# Patient Record
Sex: FEMALE
[De-identification: names, ages, dates, MRNs, and addresses within clinical notes are randomized per-mention and may not be internally consistent; named-entity substitution may affect disease eponyms.]

---

## 2022-10-05 ENCOUNTER — NON-APPOINTMENT (OUTPATIENT)
Age: 27
End: 2022-10-05

## 2024-05-29 ENCOUNTER — APPOINTMENT (OUTPATIENT)
Dept: PSYCHIATRY | Facility: CLINIC | Age: 29
End: 2024-05-29
Payer: COMMERCIAL

## 2024-05-29 PROBLEM — Z00.00 ENCOUNTER FOR PREVENTIVE HEALTH EXAMINATION: Status: ACTIVE | Noted: 2024-05-29

## 2024-05-29 PROCEDURE — 90791 PSYCH DIAGNOSTIC EVALUATION: CPT | Mod: 95

## 2024-05-31 NOTE — REASON FOR VISIT
[Patient preference] : as per patient preference [Continuity of care] : to ensure continuity of care [Continuing, patient not seen in-person within last 12 months (provide details below)] : Telehealth services are continuing, patient not seen in-person within last 12 months.  [Telehealth (audio & video) - Individual/Group] : This visit was provided via telehealth using real-time 2-way audio visual technology. [Other Location: e.g. Home (Enter Location, City,State)___] : The provider was located at [unfilled]. [Home] : The patient, [unfilled], was located at home, [unfilled], at the time of the visit. [FreeTextEntry4] : 11:00AM [FreeTextEntry5] : 12:00PM [FreeTextEntry3] : Pt has only been seen via telehealth.  [Self-Referred] : Self-Referred [Patient] : Patient [FreeTextEntry2] : anxiety [FreeTextEntry1] : anxiety

## 2024-05-31 NOTE — HISTORY OF PRESENT ILLNESS
[FreeTextEntry1] : Pt reported a history of anxiety and depression. Pt also reported a history of panic attacks. History will be assessed further in the following appointment.

## 2024-05-31 NOTE — DISCUSSION/SUMMARY
----- Message from Wayne Monreal MD sent at 1/19/2023 12:17 PM CST -----  Regarding: RE: Folic Acid  800mg folic acid PB  ----- Message -----  From: Marita Tapia RN  Sent: 1/19/2023  12:11 PM CST  To: Wayne Monreal MD, #  Subject: Folic Acid                                       [facility] contacted us to clarify a prescription:  Should patient be on 800 or 1600 mg of folic acid every day?    Please advise,       [Low acute suicide risk] : Low acute suicide risk [FreeTextEntry1] : Pt denied suicidal ideation.

## 2024-05-31 NOTE — RISK ASSESSMENT
[Clinical Interview] : Clinical Interview [No] : No [No known suicide factors] : No known suicide factors [None known] : None known [Supportive social network of family or friends] : supportive social network of family or friends [Ability to cope with stress] : ability to cope with stress [Responsibility to children, family, or others] : responsibility to children, family, or others [Engaged in work or school] : engaged in work or school [None in the patient's lifetime] : None in the patient's lifetime [None Known] : none known [No known risk factors] : No known risk factors [Residential stability] : residential stability [Relationship stability] : relationship stability [Employment stability] : employment stability [Affective stability] : affective stability [Engagement in treatment] : engagement in treatment

## 2024-05-31 NOTE — PSYCHOSOCIAL ASSESSMENT
[None known] : None known [Competitive and integrated employment] : Competitive and integrated employment [Financially stable] : financially stable

## 2024-06-12 ENCOUNTER — APPOINTMENT (OUTPATIENT)
Dept: PSYCHIATRY | Facility: CLINIC | Age: 29
End: 2024-06-12
Payer: COMMERCIAL

## 2024-06-12 PROCEDURE — 90837 PSYTX W PT 60 MINUTES: CPT | Mod: 95

## 2024-06-13 NOTE — REASON FOR VISIT
[Patient preference] : as per patient preference [Continuity of care] : to ensure continuity of care [Continuing, patient not seen in-person within last 12 months (provide details below)] : Telehealth services are continuing, patient not seen in-person within last 12 months.  [Telehealth (audio & video) - Individual/Group] : This visit was provided via telehealth using real-time 2-way audio visual technology. [Other Location: e.g. Home (Enter Location, City,State)___] : The provider was located at [unfilled]. [Home] : The patient, [unfilled], was located at home, [unfilled], at the time of the visit. [Patient] : Patient [FreeTextEntry4] : 4:00PM [FreeTextEntry5] : 5:00PM [FreeTextEntry3] : Pt has only been seen via telehealth.  [FreeTextEntry1] : anxiety

## 2024-06-13 NOTE — END OF VISIT
[Duration of Psychotherapy Visit (minutes spent in synchronous communication): ____] : Duration of Psychotherapy Visit (minutes spent in synchronous communication): [unfilled] [Individual Psychotherapy for 53+ minutes] : Individual Psychotherapy for 53+ minutes  [Teletherapy Service Provided] : The services provided in this session were delivered via tele-therapy [FreeTextEntry3] : Home [FreeTextEntry5] : Remote office via [facility] [Licensed Clinician] : Licensed Clinician

## 2024-06-13 NOTE — PLAN
[FreeTextEntry2] : Treatment plan will be formulated following completion of assessment.  [Cognitive and/or Behavior Therapy] : Cognitive and/or Behavior Therapy  [Psychoeducation] : Psychoeducation  [de-identified] : Psychologist met with Pt using teletherapy platform. Pt was at their home while psychologist was working remotely via [facility]. Pt continued to provide consent for telehealth services.   Affect was observed to be full. Mood was reported as unchanged.  Pt was responsive, receptive, and engaged throughout session. Evidence of risk was neither observed nor reported.   Goals of the session were to continue BRYANT-5 assessment and provide psychoeducation regarding CBT principles. Symptoms of anxiety, panic, and trauma were assessed, and will be revisited in the following session. [Return in ____ week(s)] : Return in [unfilled] week(s) [FreeTextEntry1] : Treatment plan will be formulated following completion of assessment.

## 2024-06-18 ENCOUNTER — APPOINTMENT (OUTPATIENT)
Dept: PSYCHIATRY | Facility: CLINIC | Age: 29
End: 2024-06-18
Payer: COMMERCIAL

## 2024-06-18 PROCEDURE — 90837 PSYTX W PT 60 MINUTES: CPT | Mod: 95

## 2024-06-18 NOTE — PLAN
[FreeTextEntry2] : Treatment goals and plan will be addressed in the following session. [Cognitive and/or Behavior Therapy] : Cognitive and/or Behavior Therapy  [Psychoeducation] : Psychoeducation  [Return in ____ week(s)] : Return in [unfilled] week(s) [de-identified] : Psychologist met with Pt using teletherapy platform. Pt was at their home while psychologist was working remotely via [facility]. Pt continued to provide consent for telehealth services.   Affect was observed to be full. Mood was reported as unchanged.  Pt was responsive, receptive, and engaged throughout session. Evidence of risk was neither observed nor reported.   Goals of the session were to continue BRYANT-5 assessment and provide psychoeducation regarding CBT principles. Pt currently meets criteria for Generalized Anxiety Disorder. Treatment goals and plan for care will be addressed in the following appointment.  [FreeTextEntry1] : Treatment goals and plan will be addressed in the following session.

## 2024-06-18 NOTE — REASON FOR VISIT
[Patient preference] : as per patient preference [Continuity of care] : to ensure continuity of care [Continuing, patient not seen in-person within last 12 months (provide details below)] : Telehealth services are continuing, patient not seen in-person within last 12 months.  [Telehealth (audio & video) - Individual/Group] : This visit was provided via telehealth using real-time 2-way audio visual technology. [Other Location: e.g. Home (Enter Location, City,State)___] : The provider was located at [unfilled]. [Home] : The patient, [unfilled], was located at home, [unfilled], at the time of the visit. [FreeTextEntry4] : 4:00PM [FreeTextEntry5] : 5:00PM [FreeTextEntry3] : Pt has only been seen via telehealth.  [Patient] : Patient [FreeTextEntry1] : anxiety

## 2024-06-18 NOTE — END OF VISIT
[Duration of Psychotherapy Visit (minutes spent in synchronous communication): ____] : Duration of Psychotherapy Visit (minutes spent in synchronous communication): [unfilled] [Individual Psychotherapy for 53+ minutes] : Individual Psychotherapy for 53+ minutes  [Teletherapy Service Provided] : The services provided in this session were delivered via tele-therapy [FreeTextEntry3] : Home [FreeTextEntry5] : Remote office via [facility] [Licensed Clinician] : Licensed Clinician

## 2024-06-19 ENCOUNTER — APPOINTMENT (OUTPATIENT)
Dept: PSYCHIATRY | Facility: CLINIC | Age: 29
End: 2024-06-19

## 2024-06-26 ENCOUNTER — APPOINTMENT (OUTPATIENT)
Dept: PSYCHIATRY | Facility: CLINIC | Age: 29
End: 2024-06-26
Payer: COMMERCIAL

## 2024-06-26 PROCEDURE — 90837 PSYTX W PT 60 MINUTES: CPT | Mod: 95

## 2024-06-27 ENCOUNTER — TRANSCRIPTION ENCOUNTER (OUTPATIENT)
Age: 29
End: 2024-06-27

## 2024-07-05 ENCOUNTER — APPOINTMENT (OUTPATIENT)
Dept: PSYCHIATRY | Facility: CLINIC | Age: 29
End: 2024-07-05
Payer: COMMERCIAL

## 2024-07-05 PROCEDURE — 90837 PSYTX W PT 60 MINUTES: CPT | Mod: 95

## 2024-07-10 ENCOUNTER — APPOINTMENT (OUTPATIENT)
Dept: PSYCHIATRY | Facility: CLINIC | Age: 29
End: 2024-07-10
Payer: COMMERCIAL

## 2024-07-10 PROCEDURE — 90837 PSYTX W PT 60 MINUTES: CPT | Mod: 95

## 2024-07-17 ENCOUNTER — APPOINTMENT (OUTPATIENT)
Dept: PSYCHIATRY | Facility: CLINIC | Age: 29
End: 2024-07-17
Payer: COMMERCIAL

## 2024-07-17 PROCEDURE — 90837 PSYTX W PT 60 MINUTES: CPT | Mod: 95

## 2024-07-24 ENCOUNTER — APPOINTMENT (OUTPATIENT)
Dept: PSYCHIATRY | Facility: CLINIC | Age: 29
End: 2024-07-24
Payer: COMMERCIAL

## 2024-07-24 PROCEDURE — 90837 PSYTX W PT 60 MINUTES: CPT | Mod: 95

## 2024-07-31 ENCOUNTER — APPOINTMENT (OUTPATIENT)
Dept: PSYCHIATRY | Facility: CLINIC | Age: 29
End: 2024-07-31

## 2024-08-05 ENCOUNTER — APPOINTMENT (OUTPATIENT)
Dept: PSYCHIATRY | Facility: CLINIC | Age: 29
End: 2024-08-05

## 2024-08-05 PROCEDURE — 90837 PSYTX W PT 60 MINUTES: CPT | Mod: 95

## 2024-08-05 NOTE — END OF VISIT
[Duration of Psychotherapy Visit (minutes spent in synchronous communication): ____] : Duration of Psychotherapy Visit (minutes spent in synchronous communication): [unfilled] [Individual Psychotherapy for 53+ minutes] : Individual Psychotherapy for 53+ minutes  [Teletherapy Service Provided] : The services provided in this session were delivered via tele-therapy [FreeTextEntry3] : Home [FreeTextEntry5] : Remote office via [facility] [Licensed Clinician] : Licensed Clinician

## 2024-08-05 NOTE — PLAN
[FreeTextEntry2] : Pt seeks to decrease the frequency and intensity of anxiety symptoms.  [Cognitive and/or Behavior Therapy] : Cognitive and/or Behavior Therapy  [Psychoeducation] : Psychoeducation  [de-identified] : Psychologist met with Pt using teletherapy platform. Pt was at their home while psychologist was working remotely via [facility]. Pt continued to provide consent for telehealth services.   Affect was observed to be full. Mood was reported as unchanged.  Pt was responsive, receptive, and engaged throughout session. Evidence of risk was neither observed nor reported.   Goals of the session were to review recent antecedents to worry, provide psychoeducation regarding information gathering and reassurance seeking, as well as plan to discuss questions with Pt's daughter's pediatrician and seek additional resources, which will be revisited in the following session. [Return in ____ week(s)] : Return in [unfilled] week(s) [FreeTextEntry1] : Psychoeducation about Diagnosis & Treatment Model  1. Pt will learn about their symptoms and diagnosis and be able to explain it back to psychologist.  2. Pt will learn about the CBT model and be able to accurately categorize their symptoms and diagnosis in terms of cognition, emotional responses, behaviors, and physiological arousal.   Relaxation Techniques  1. Pt can benefit from learning and practicing Relaxation Techniques to reduce physiological arousal, including flight/flight/freeze responses, manifested by muscle tightness or tension; emotions including anxiety and panic, sadness and depression, and anger.  2. Relaxation techniques can include any or all of the following: Diaphragmatic Breathing (DB), Progressive Muscle Relaxation (PMR), Imaginal Relaxation (IMR), or mindfulness.  3. Relaxation techniques will be learned and practiced within sessions, and assigned for homework between sessions. This will entail interventions such using monitoring forms to track their stress and other symptoms, and assess the efficacy of their implementation of relaxation techniques.   Cognitive Therapy Interventions   1. Pt can benefit from learning and practicing cognitive restructuring, including learning to categorize automatic thoughts/cognitive distortions, judgments, biases, and attributions   2. Pt can benefit from using cognitive reframing to challenge cognitive distortions to increase flexibility in thinking   3. Pt can benefit from using self-coping statements to encourage themselves and provide validation   4. Cognitive restructuring will be learned and practiced within session, and assigned for homework between sessions. This will entail interventions such as mood monitoring and thought record forms, to challenge cognitive distortions in real-time.     Exposure Therapy Interventions   1. Pt can benefit from learning/continuing exposure therapy interventions, involving confronting situational, emotional, physiological, and cognitive avoided situations.   2. Pt can benefit from reducing and eliminating safety behaviors/objects, such as maladaptive response/ritualization prevention, removing "just in case" items that maintain anxiety, panic, obsessive, and post-traumatic symptoms.   3. Exposure therapy involves any or all of these modalities: in vivo, imaginal, interoceptive, Virtual Reality, written/narrative, and analog in vivo exposure    4. Exposures will be conducted within psychotherapy sessions and assigned for homework between sessions

## 2024-08-05 NOTE — REASON FOR VISIT
[Patient preference] : as per patient preference [Continuity of care] : to ensure continuity of care [Continuing, patient not seen in-person within last 12 months (provide details below)] : Telehealth services are continuing, patient not seen in-person within last 12 months.  [Telehealth (audio & video) - Individual/Group] : This visit was provided via telehealth using real-time 2-way audio visual technology. [Other Location: e.g. Home (Enter Location, City,State)___] : The provider was located at [unfilled]. [Home] : The patient, [unfilled], was located at home, [unfilled], at the time of the visit. [FreeTextEntry4] : 1:00PM [FreeTextEntry5] : 2:00PM [FreeTextEntry3] : Pt has only been seen via telehealth.  [Patient] : Patient [FreeTextEntry1] : anxiety

## 2024-08-13 ENCOUNTER — APPOINTMENT (OUTPATIENT)
Dept: PSYCHIATRY | Facility: CLINIC | Age: 29
End: 2024-08-13
Payer: COMMERCIAL

## 2024-08-13 PROCEDURE — 90832 PSYTX W PT 30 MINUTES: CPT | Mod: 95

## 2024-08-13 NOTE — PLAN
[FreeTextEntry2] : Pt seeks to decrease the frequency and intensity of anxiety symptoms.  [Cognitive and/or Behavior Therapy] : Cognitive and/or Behavior Therapy  [Psychoeducation] : Psychoeducation  [de-identified] : Psychologist met with Pt using teletherapy platform. Pt was at their home while psychologist was working remotely via [facility]. Pt continued to provide consent for telehealth services.   Affect was observed to be full. Mood was reported as unchanged.  Pt was responsive, receptive, and engaged throughout session. Evidence of risk was neither observed nor reported.   Goals of the session were to review symptoms of anxiety and antecedents to distress and worry, revisit completed tasks/exercises in recent weeks, and plan for the following weeks. Session was 32-minutes in duration due to Pt schedule conflict and work demands. [Return in ____ week(s)] : Return in [unfilled] week(s) [FreeTextEntry1] : Psychoeducation about Diagnosis & Treatment Model  1. Pt will learn about their symptoms and diagnosis and be able to explain it back to psychologist.  2. Pt will learn about the CBT model and be able to accurately categorize their symptoms and diagnosis in terms of cognition, emotional responses, behaviors, and physiological arousal.   Relaxation Techniques  1. Pt can benefit from learning and practicing Relaxation Techniques to reduce physiological arousal, including flight/flight/freeze responses, manifested by muscle tightness or tension; emotions including anxiety and panic, sadness and depression, and anger.  2. Relaxation techniques can include any or all of the following: Diaphragmatic Breathing (DB), Progressive Muscle Relaxation (PMR), Imaginal Relaxation (IMR), or mindfulness.  3. Relaxation techniques will be learned and practiced within sessions, and assigned for homework between sessions. This will entail interventions such using monitoring forms to track their stress and other symptoms, and assess the efficacy of their implementation of relaxation techniques.   Cognitive Therapy Interventions   1. Pt can benefit from learning and practicing cognitive restructuring, including learning to categorize automatic thoughts/cognitive distortions, judgments, biases, and attributions   2. Pt can benefit from using cognitive reframing to challenge cognitive distortions to increase flexibility in thinking   3. Pt can benefit from using self-coping statements to encourage themselves and provide validation   4. Cognitive restructuring will be learned and practiced within session, and assigned for homework between sessions. This will entail interventions such as mood monitoring and thought record forms, to challenge cognitive distortions in real-time.     Exposure Therapy Interventions   1. Pt can benefit from learning/continuing exposure therapy interventions, involving confronting situational, emotional, physiological, and cognitive avoided situations.   2. Pt can benefit from reducing and eliminating safety behaviors/objects, such as maladaptive response/ritualization prevention, removing "just in case" items that maintain anxiety, panic, obsessive, and post-traumatic symptoms.   3. Exposure therapy involves any or all of these modalities: in vivo, imaginal, interoceptive, Virtual Reality, written/narrative, and analog in vivo exposure    4. Exposures will be conducted within psychotherapy sessions and assigned for homework between sessions

## 2024-08-13 NOTE — END OF VISIT
[Duration of Psychotherapy Visit (minutes spent in synchronous communication): ____] : Duration of Psychotherapy Visit (minutes spent in synchronous communication): [unfilled] [Individual Psychotherapy for 16-37 minutes] : Individual Psychotherapy for 16-37 minutes [Teletherapy Service Provided] : The services provided in this session were delivered via tele-therapy [FreeTextEntry3] : Home [Licensed Clinician] : Licensed Clinician [FreeTextEntry5] : Remote office via [facility]

## 2024-08-14 ENCOUNTER — APPOINTMENT (OUTPATIENT)
Dept: PSYCHIATRY | Facility: CLINIC | Age: 29
End: 2024-08-14

## 2024-08-20 ENCOUNTER — APPOINTMENT (OUTPATIENT)
Dept: PSYCHIATRY | Facility: CLINIC | Age: 29
End: 2024-08-20

## 2024-08-28 ENCOUNTER — APPOINTMENT (OUTPATIENT)
Dept: PSYCHIATRY | Facility: CLINIC | Age: 29
End: 2024-08-28
Payer: COMMERCIAL

## 2024-08-28 PROCEDURE — 90834 PSYTX W PT 45 MINUTES: CPT | Mod: 95

## 2024-08-29 NOTE — PLAN
[Cognitive and/or Behavior Therapy] : Cognitive and/or Behavior Therapy  [Psychoeducation] : Psychoeducation  [Return in ____ week(s)] : Return in [unfilled] week(s) [FreeTextEntry2] : Pt seeks to decrease the frequency and intensity of anxiety symptoms.  [de-identified] : Psychologist met with Pt using teletherapy platform. Pt was at their home while psychologist was working remotely via [facility]. Pt continued to provide consent for telehealth services.   Affect was observed to be full. Mood was reported as unchanged.  Pt was responsive, receptive, and engaged throughout session. Evidence of risk was neither observed nor reported.   Goals of the session were to review skills practiced, revisit recent instances of worry, and practice cognitive restructuring (e.g., cognitive reframing; cognitive flexibility) in-session, which Pt will continue to do for hw.  [FreeTextEntry1] : Psychoeducation about Diagnosis & Treatment Model  1. Pt will learn about their symptoms and diagnosis and be able to explain it back to psychologist.  2. Pt will learn about the CBT model and be able to accurately categorize their symptoms and diagnosis in terms of cognition, emotional responses, behaviors, and physiological arousal.   Relaxation Techniques  1. Pt can benefit from learning and practicing Relaxation Techniques to reduce physiological arousal, including flight/flight/freeze responses, manifested by muscle tightness or tension; emotions including anxiety and panic, sadness and depression, and anger.  2. Relaxation techniques can include any or all of the following: Diaphragmatic Breathing (DB), Progressive Muscle Relaxation (PMR), Imaginal Relaxation (IMR), or mindfulness.  3. Relaxation techniques will be learned and practiced within sessions, and assigned for homework between sessions. This will entail interventions such using monitoring forms to track their stress and other symptoms, and assess the efficacy of their implementation of relaxation techniques.   Cognitive Therapy Interventions   1. Pt can benefit from learning and practicing cognitive restructuring, including learning to categorize automatic thoughts/cognitive distortions, judgments, biases, and attributions   2. Pt can benefit from using cognitive reframing to challenge cognitive distortions to increase flexibility in thinking   3. Pt can benefit from using self-coping statements to encourage themselves and provide validation   4. Cognitive restructuring will be learned and practiced within session, and assigned for homework between sessions. This will entail interventions such as mood monitoring and thought record forms, to challenge cognitive distortions in real-time.     Exposure Therapy Interventions   1. Pt can benefit from learning/continuing exposure therapy interventions, involving confronting situational, emotional, physiological, and cognitive avoided situations.   2. Pt can benefit from reducing and eliminating safety behaviors/objects, such as maladaptive response/ritualization prevention, removing "just in case" items that maintain anxiety, panic, obsessive, and post-traumatic symptoms.   3. Exposure therapy involves any or all of these modalities: in vivo, imaginal, interoceptive, Virtual Reality, written/narrative, and analog in vivo exposure    4. Exposures will be conducted within psychotherapy sessions and assigned for homework between sessions

## 2024-08-29 NOTE — END OF VISIT
[Duration of Psychotherapy Visit (minutes spent in synchronous communication): ____] : Duration of Psychotherapy Visit (minutes spent in synchronous communication): [unfilled] [Individual Psychotherapy for 16-37 minutes] : Individual Psychotherapy for 16-37 minutes [Teletherapy Service Provided] : The services provided in this session were delivered via tele-therapy [Licensed Clinician] : Licensed Clinician [Individual Psychotherapy for 38-52 minutes] : Individual Psychotherapy for 38 - 52 minutes [FreeTextEntry3] : Home [FreeTextEntry5] : Remote office via [facility]

## 2024-08-29 NOTE — END OF VISIT
[Duration of Psychotherapy Visit (minutes spent in synchronous communication): ____] : Duration of Psychotherapy Visit (minutes spent in synchronous communication): [unfilled] [Individual Psychotherapy for 16-37 minutes] : Individual Psychotherapy for 16-37 minutes [Teletherapy Service Provided] : The services provided in this session were delivered via tele-therapy [Licensed Clinician] : Licensed Clinician [Individual Psychotherapy for 38-52 minutes] : Individual Psychotherapy for 38 - 52 minutes [FreeTextEntry3] : Home [FreeTextEntry5] : Remote office via [facility]

## 2024-08-29 NOTE — PLAN
[Cognitive and/or Behavior Therapy] : Cognitive and/or Behavior Therapy  [Psychoeducation] : Psychoeducation  [Return in ____ week(s)] : Return in [unfilled] week(s) [FreeTextEntry2] : Pt seeks to decrease the frequency and intensity of anxiety symptoms.  [de-identified] : Psychologist met with Pt using teletherapy platform. Pt was at their home while psychologist was working remotely via [facility]. Pt continued to provide consent for telehealth services.   Affect was observed to be full. Mood was reported as unchanged.  Pt was responsive, receptive, and engaged throughout session. Evidence of risk was neither observed nor reported.   Goals of the session were to review skills practiced, revisit recent instances of worry, and practice cognitive restructuring (e.g., cognitive reframing; cognitive flexibility) in-session, which Pt will continue to do for hw.  [FreeTextEntry1] : Psychoeducation about Diagnosis & Treatment Model  1. Pt will learn about their symptoms and diagnosis and be able to explain it back to psychologist.  2. Pt will learn about the CBT model and be able to accurately categorize their symptoms and diagnosis in terms of cognition, emotional responses, behaviors, and physiological arousal.   Relaxation Techniques  1. Pt can benefit from learning and practicing Relaxation Techniques to reduce physiological arousal, including flight/flight/freeze responses, manifested by muscle tightness or tension; emotions including anxiety and panic, sadness and depression, and anger.  2. Relaxation techniques can include any or all of the following: Diaphragmatic Breathing (DB), Progressive Muscle Relaxation (PMR), Imaginal Relaxation (IMR), or mindfulness.  3. Relaxation techniques will be learned and practiced within sessions, and assigned for homework between sessions. This will entail interventions such using monitoring forms to track their stress and other symptoms, and assess the efficacy of their implementation of relaxation techniques.   Cognitive Therapy Interventions   1. Pt can benefit from learning and practicing cognitive restructuring, including learning to categorize automatic thoughts/cognitive distortions, judgments, biases, and attributions   2. Pt can benefit from using cognitive reframing to challenge cognitive distortions to increase flexibility in thinking   3. Pt can benefit from using self-coping statements to encourage themselves and provide validation   4. Cognitive restructuring will be learned and practiced within session, and assigned for homework between sessions. This will entail interventions such as mood monitoring and thought record forms, to challenge cognitive distortions in real-time.     Exposure Therapy Interventions   1. Pt can benefit from learning/continuing exposure therapy interventions, involving confronting situational, emotional, physiological, and cognitive avoided situations.   2. Pt can benefit from reducing and eliminating safety behaviors/objects, such as maladaptive response/ritualization prevention, removing "just in case" items that maintain anxiety, panic, obsessive, and post-traumatic symptoms.   3. Exposure therapy involves any or all of these modalities: in vivo, imaginal, interoceptive, Virtual Reality, written/narrative, and analog in vivo exposure    4. Exposures will be conducted within psychotherapy sessions and assigned for homework between sessions

## 2024-09-04 ENCOUNTER — APPOINTMENT (OUTPATIENT)
Dept: PSYCHIATRY | Facility: CLINIC | Age: 29
End: 2024-09-04
Payer: COMMERCIAL

## 2024-09-04 PROCEDURE — 90837 PSYTX W PT 60 MINUTES: CPT | Mod: 95

## 2024-09-06 NOTE — END OF VISIT
[Duration of Psychotherapy Visit (minutes spent in synchronous communication): ____] : Duration of Psychotherapy Visit (minutes spent in synchronous communication): [unfilled] [Individual Psychotherapy for 38-52 minutes] : Individual Psychotherapy for 38 - 52 minutes [Teletherapy Service Provided] : The services provided in this session were delivered via tele-therapy [Licensed Clinician] : Licensed Clinician [Individual Psychotherapy for 53+ minutes] : Individual Psychotherapy for 53+ minutes  [FreeTextEntry3] : Home [FreeTextEntry5] : Remote office via [facility]

## 2024-09-06 NOTE — PLAN
[Cognitive and/or Behavior Therapy] : Cognitive and/or Behavior Therapy  [Psychoeducation] : Psychoeducation  [Return in ____ week(s)] : Return in [unfilled] week(s) [FreeTextEntry2] : Pt seeks to decrease the frequency and intensity of anxiety symptoms.  [de-identified] : Psychologist met with Pt using teletherapy platform. Pt was at their home while psychologist was working remotely via [facility]. Pt continued to provide consent for telehealth services.   Affect was observed to be full. Mood was reported as unchanged.  Pt was responsive, receptive, and engaged throughout session. Evidence of risk was neither observed nor reported.   Goals of the session were to review Pt's practice of cognitive restructuring skills, continue in-session cognitive restructuring exercises (e.g., evidence against the thought; cognitive reframing), review future exposure exercises, and plan for the following week. Pt will continue skills practice for hw.   [FreeTextEntry1] : Psychoeducation about Diagnosis & Treatment Model  1. Pt will learn about their symptoms and diagnosis and be able to explain it back to psychologist.  2. Pt will learn about the CBT model and be able to accurately categorize their symptoms and diagnosis in terms of cognition, emotional responses, behaviors, and physiological arousal.   Relaxation Techniques  1. Pt can benefit from learning and practicing Relaxation Techniques to reduce physiological arousal, including flight/flight/freeze responses, manifested by muscle tightness or tension; emotions including anxiety and panic, sadness and depression, and anger.  2. Relaxation techniques can include any or all of the following: Diaphragmatic Breathing (DB), Progressive Muscle Relaxation (PMR), Imaginal Relaxation (IMR), or mindfulness.  3. Relaxation techniques will be learned and practiced within sessions, and assigned for homework between sessions. This will entail interventions such using monitoring forms to track their stress and other symptoms, and assess the efficacy of their implementation of relaxation techniques.   Cognitive Therapy Interventions   1. Pt can benefit from learning and practicing cognitive restructuring, including learning to categorize automatic thoughts/cognitive distortions, judgments, biases, and attributions   2. Pt can benefit from using cognitive reframing to challenge cognitive distortions to increase flexibility in thinking   3. Pt can benefit from using self-coping statements to encourage themselves and provide validation   4. Cognitive restructuring will be learned and practiced within session, and assigned for homework between sessions. This will entail interventions such as mood monitoring and thought record forms, to challenge cognitive distortions in real-time.     Exposure Therapy Interventions   1. Pt can benefit from learning/continuing exposure therapy interventions, involving confronting situational, emotional, physiological, and cognitive avoided situations.   2. Pt can benefit from reducing and eliminating safety behaviors/objects, such as maladaptive response/ritualization prevention, removing "just in case" items that maintain anxiety, panic, obsessive, and post-traumatic symptoms.   3. Exposure therapy involves any or all of these modalities: in vivo, imaginal, interoceptive, Virtual Reality, written/narrative, and analog in vivo exposure    4. Exposures will be conducted within psychotherapy sessions and assigned for homework between sessions

## 2024-09-06 NOTE — PLAN
[Cognitive and/or Behavior Therapy] : Cognitive and/or Behavior Therapy  [Psychoeducation] : Psychoeducation  [Return in ____ week(s)] : Return in [unfilled] week(s) [FreeTextEntry2] : Pt seeks to decrease the frequency and intensity of anxiety symptoms.  [de-identified] : Psychologist met with Pt using teletherapy platform. Pt was at their home while psychologist was working remotely via [facility]. Pt continued to provide consent for telehealth services.   Affect was observed to be full. Mood was reported as unchanged.  Pt was responsive, receptive, and engaged throughout session. Evidence of risk was neither observed nor reported.   Goals of the session were to review Pt's practice of cognitive restructuring skills, continue in-session cognitive restructuring exercises (e.g., evidence against the thought; cognitive reframing), review future exposure exercises, and plan for the following week. Pt will continue skills practice for hw.   [FreeTextEntry1] : Psychoeducation about Diagnosis & Treatment Model  1. Pt will learn about their symptoms and diagnosis and be able to explain it back to psychologist.  2. Pt will learn about the CBT model and be able to accurately categorize their symptoms and diagnosis in terms of cognition, emotional responses, behaviors, and physiological arousal.   Relaxation Techniques  1. Pt can benefit from learning and practicing Relaxation Techniques to reduce physiological arousal, including flight/flight/freeze responses, manifested by muscle tightness or tension; emotions including anxiety and panic, sadness and depression, and anger.  2. Relaxation techniques can include any or all of the following: Diaphragmatic Breathing (DB), Progressive Muscle Relaxation (PMR), Imaginal Relaxation (IMR), or mindfulness.  3. Relaxation techniques will be learned and practiced within sessions, and assigned for homework between sessions. This will entail interventions such using monitoring forms to track their stress and other symptoms, and assess the efficacy of their implementation of relaxation techniques.   Cognitive Therapy Interventions   1. Pt can benefit from learning and practicing cognitive restructuring, including learning to categorize automatic thoughts/cognitive distortions, judgments, biases, and attributions   2. Pt can benefit from using cognitive reframing to challenge cognitive distortions to increase flexibility in thinking   3. Pt can benefit from using self-coping statements to encourage themselves and provide validation   4. Cognitive restructuring will be learned and practiced within session, and assigned for homework between sessions. This will entail interventions such as mood monitoring and thought record forms, to challenge cognitive distortions in real-time.     Exposure Therapy Interventions   1. Pt can benefit from learning/continuing exposure therapy interventions, involving confronting situational, emotional, physiological, and cognitive avoided situations.   2. Pt can benefit from reducing and eliminating safety behaviors/objects, such as maladaptive response/ritualization prevention, removing "just in case" items that maintain anxiety, panic, obsessive, and post-traumatic symptoms.   3. Exposure therapy involves any or all of these modalities: in vivo, imaginal, interoceptive, Virtual Reality, written/narrative, and analog in vivo exposure    4. Exposures will be conducted within psychotherapy sessions and assigned for homework between sessions

## 2024-09-06 NOTE — END OF VISIT
[Duration of Psychotherapy Visit (minutes spent in synchronous communication): ____] : Duration of Psychotherapy Visit (minutes spent in synchronous communication): [unfilled] [Individual Psychotherapy for 38-52 minutes] : Individual Psychotherapy for 38 - 52 minutes [Teletherapy Service Provided] : The services provided in this session were delivered via tele-therapy [Licensed Clinician] : Licensed Clinician [Individual Psychotherapy for 53+ minutes] : Individual Psychotherapy for 53+ minutes  [FreeTextEntry3] : Home [FreeTextEntry5] : Remote office via [facility]

## 2024-09-11 ENCOUNTER — APPOINTMENT (OUTPATIENT)
Dept: PSYCHIATRY | Facility: CLINIC | Age: 29
End: 2024-09-11
Payer: COMMERCIAL

## 2024-09-11 PROCEDURE — 90837 PSYTX W PT 60 MINUTES: CPT | Mod: 95

## 2024-09-12 NOTE — END OF VISIT
[Duration of Psychotherapy Visit (minutes spent in synchronous communication): ____] : Duration of Psychotherapy Visit (minutes spent in synchronous communication): [unfilled] [Individual Psychotherapy for 53+ minutes] : Individual Psychotherapy for 53+ minutes  [Teletherapy Service Provided] : The services provided in this session were delivered via tele-therapy [FreeTextEntry3] : Home [FreeTextEntry5] : Remote office via [facility] [Licensed Clinician] : Licensed Clinician

## 2024-09-12 NOTE — PLAN
[FreeTextEntry2] : Pt seeks to decrease the frequency and intensity of anxiety symptoms.  [Cognitive and/or Behavior Therapy] : Cognitive and/or Behavior Therapy  [Psychoeducation] : Psychoeducation  [de-identified] : Psychologist met with Pt using teletherapy platform. Pt was at their home while psychologist was working remotely via [facility]. Pt continued to provide consent for telehealth services.   Affect was observed to be full. Mood was reported as unchanged.  Pt was responsive, receptive, and engaged throughout session. Evidence of risk was neither observed nor reported.   Pt denied completing hw. Goals of the session were to review cognitive restructuring skills, identify thoughts, feelings, and Pt responses to recent worry, and practice cognitive reframing in-session. The importance of self-care and rest was highlighted. Exposure exercises and behavioral experiments were discussed, and will be revisited in the following appointment.  [Return in ____ week(s)] : Return in [unfilled] week(s) [FreeTextEntry1] : Psychoeducation about Diagnosis & Treatment Model  1. Pt will learn about their symptoms and diagnosis and be able to explain it back to psychologist.  2. Pt will learn about the CBT model and be able to accurately categorize their symptoms and diagnosis in terms of cognition, emotional responses, behaviors, and physiological arousal.   Relaxation Techniques  1. Pt can benefit from learning and practicing Relaxation Techniques to reduce physiological arousal, including flight/flight/freeze responses, manifested by muscle tightness or tension; emotions including anxiety and panic, sadness and depression, and anger.  2. Relaxation techniques can include any or all of the following: Diaphragmatic Breathing (DB), Progressive Muscle Relaxation (PMR), Imaginal Relaxation (IMR), or mindfulness.  3. Relaxation techniques will be learned and practiced within sessions, and assigned for homework between sessions. This will entail interventions such using monitoring forms to track their stress and other symptoms, and assess the efficacy of their implementation of relaxation techniques.   Cognitive Therapy Interventions   1. Pt can benefit from learning and practicing cognitive restructuring, including learning to categorize automatic thoughts/cognitive distortions, judgments, biases, and attributions   2. Pt can benefit from using cognitive reframing to challenge cognitive distortions to increase flexibility in thinking   3. Pt can benefit from using self-coping statements to encourage themselves and provide validation   4. Cognitive restructuring will be learned and practiced within session, and assigned for homework between sessions. This will entail interventions such as mood monitoring and thought record forms, to challenge cognitive distortions in real-time.     Exposure Therapy Interventions   1. Pt can benefit from learning/continuing exposure therapy interventions, involving confronting situational, emotional, physiological, and cognitive avoided situations.   2. Pt can benefit from reducing and eliminating safety behaviors/objects, such as maladaptive response/ritualization prevention, removing "just in case" items that maintain anxiety, panic, obsessive, and post-traumatic symptoms.   3. Exposure therapy involves any or all of these modalities: in vivo, imaginal, interoceptive, Virtual Reality, written/narrative, and analog in vivo exposure    4. Exposures will be conducted within psychotherapy sessions and assigned for homework between sessions

## 2024-09-18 ENCOUNTER — APPOINTMENT (OUTPATIENT)
Dept: PSYCHIATRY | Facility: CLINIC | Age: 29
End: 2024-09-18

## 2024-09-25 ENCOUNTER — APPOINTMENT (OUTPATIENT)
Dept: PSYCHIATRY | Facility: CLINIC | Age: 29
End: 2024-09-25